# Patient Record
Sex: FEMALE | Race: WHITE
[De-identification: names, ages, dates, MRNs, and addresses within clinical notes are randomized per-mention and may not be internally consistent; named-entity substitution may affect disease eponyms.]

---

## 2018-05-02 ENCOUNTER — HOSPITAL ENCOUNTER (EMERGENCY)
Dept: HOSPITAL 25 - UCCORT | Age: 58
Discharge: HOME | End: 2018-05-02
Payer: COMMERCIAL

## 2018-05-02 VITALS — DIASTOLIC BLOOD PRESSURE: 81 MMHG | SYSTOLIC BLOOD PRESSURE: 123 MMHG

## 2018-05-02 DIAGNOSIS — Z88.2: ICD-10-CM

## 2018-05-02 DIAGNOSIS — B34.9: ICD-10-CM

## 2018-05-02 DIAGNOSIS — J06.9: Primary | ICD-10-CM

## 2018-05-02 DIAGNOSIS — I10: ICD-10-CM

## 2018-05-02 DIAGNOSIS — Z91.030: ICD-10-CM

## 2018-05-02 DIAGNOSIS — J02.9: ICD-10-CM

## 2018-05-02 PROCEDURE — G0463 HOSPITAL OUTPT CLINIC VISIT: HCPCS

## 2018-05-02 PROCEDURE — 99211 OFF/OP EST MAY X REQ PHY/QHP: CPT

## 2018-05-02 NOTE — UC
Respiratory Complaint HPI





- HPI Summary


HPI Summary: 





Sore throat and cough for about 4 days. No fever. Sick relatives were visiting. 

She denies lung disease, smoking, asthma. It hurts more to cough. 





- History of Current Complaint


Chief Complaint: UCRespiratory


Stated Complaint: SORE THROAT,EARS


Time Seen by Provider: 05/02/18 12:22


Hx Obtained From: Patient


Hx Last Menstrual Period: 2 yrs


Pregnant?: No


Onset/Duration: Gradual Onset, Lasting Days


Timing: Constant


Severity Initially: Mild


Severity Currently: Severe


Pain Intensity: 7


Character: Cough: Productive - green.


Aggravating Factors: Deep Breaths, Recumbent Position


Alleviating Factors: Upright Position, Spontaneous Resolution


Associated Signs And Symptoms: Positive: URI, Nasal Congestion.  Negative: 

Dyspnea, Fever, Chills, Hemoptysis, Dizziness, Calf Pain, Calf Swelling





- Risk Factors


Pulmonary Embolism Risk Factors: Negative





- Allergies/Home Medications


Allergies/Adverse Reactions: 


 Allergies











Allergy/AdvReac Type Severity Reaction Status Date / Time


 


Sulfa (Sulfonamide AdvReac  Diarrhea Verified 05/02/18 12:18





Antibiotics)     


 


bee sting Allergy  Swelling Uncoded 05/02/18 12:19





   Of  





   Face,Lips,&  





   Throat  











Home Medications: 


 Home Medications





Ibuprofen TAB* [Motrin TAB* 400 MG] 400 mg PO ONCE PRN 05/02/18 [History 

Confirmed 05/02/18]


Telmisartan 80 mg PO QPM 05/02/18 [History Confirmed 05/02/18]











PMH/Surg Hx/FS Hx/Imm Hx


Previously Healthy: No - HTN. 





- Surgical History


Surgical History: Yes


Surgery Procedure, Year, and Place: c-sections x4, appy 2009, L knee meniscus, 

ACL, patella repair 2/16





- Family History


Known Family History: Positive: Hypertension





- Social History


Occupation: Employed Full-time


Lives: With Family


Alcohol Use: Rare


Substance Use Type: None


Smoking Status (MU): Never Smoked Tobacco





- Immunization History


Most Recent Influenza Vaccination: Not the 2014/2015 Season





Review of Systems


ENT: Sore Throat


Respiratory: Cough


All Other Systems Reviewed And Are Negative: Yes





Physical Exam


Triage Information Reviewed: Yes


Appearance: Well-Appearing, No Pain Distress, Well-Nourished


Vital Signs: 


 Initial Vital Signs











Temp  97.7 F   05/02/18 12:07


 


Pulse  83   05/02/18 12:07


 


Resp  18   05/02/18 12:07


 


BP  123/81   05/02/18 12:07


 


Pulse Ox  98   05/02/18 12:07











Vital Signs Reviewed: Yes


Eye Exam: Normal


Eyes: Positive: Conjunctiva Clear


ENT: Positive: Pharynx normal, TM bulging - There is holly fluid mild in both 

ears without purulent effusions., Uvula midline.  Negative: Pharyngeal erythema

, TM dull, TM red, Tonsillar swelling, Tonsillar exudate, Trismus, Muffled voice

, Hoarse voice, Dental tenderness, Sinus tenderness


Neck: Positive: Supple, Nontender, No Lymphadenopathy


Respiratory: Positive: Chest non-tender, Lungs clear, Normal breath sounds, No 

respiratory distress, No accessory muscle use.  Negative: Respiratory distress, 

Decreased breath sounds, Accessory muscle use, Crackles, Rhonchi, Stridor, 

Wheezing


Cardiovascular: Positive: No Murmur, Pulses Normal, Brisk Capillary Refill


Abdomen Description: Positive: No Organomegaly, Soft.  Negative: Distended, 

Guarding


Musculoskeletal: Positive: Strength Intact, ROM Intact, No Edema


Neurological: Positive: Alert, Muscle Tone Normal.  Negative: Fatigued


Psychological: Positive: Age Appropriate Behavior


Skin: Negative: rashes





[facility] Diagnostic Evaluation





- Laboratory


O2 Sat by Pulse Oximetry: 98





Respiratory Course/Dx





- Course


Course Of Treatment: 4 days of uri symptoms. Sore throat is worse but no 

palpable adenopathy, purulence on the tonsils or fever to suggest strep throat. 

Supportive care described in detail. She did not want tessalon perles or note 

for work.





- Differential Dx/Diagnosis


Provider Diagnoses: uri.  pharyngitis.  viral illness.





Discharge





- Sign-Out/Discharge


Documenting (check all that apply): Discharge/Admit/Transfer





- Discharge Plan


Condition: Good


Disposition: HOME


Patient Education Materials:  Upper Respiratory Infection (ED), Pharyngitis (ED)


Referrals: 


Li Resendez PA [Primary Care Provider] - If Needed





- Billing Disposition and Condition


Condition: GOOD


Disposition: HOME